# Patient Record
Sex: FEMALE | Race: WHITE | Employment: OTHER | URBAN - NONMETROPOLITAN AREA
[De-identification: names, ages, dates, MRNs, and addresses within clinical notes are randomized per-mention and may not be internally consistent; named-entity substitution may affect disease eponyms.]

---

## 2017-04-01 PROBLEM — O09.33 NO PRENATAL CARE IN CURRENT PREGNANCY IN THIRD TRIMESTER: Status: ACTIVE | Noted: 2017-04-01

## 2021-05-28 ENCOUNTER — NURSE ONLY (OUTPATIENT)
Dept: LAB | Age: 31
End: 2021-05-28

## 2021-06-03 LAB
APTIMA MEDIA TYPE: NORMAL
CHLAMYDIA TRACHOMATIS AMPLIFIED DET: NEGATIVE
NEISSERIA GONORRHOEAE BY AMP: NEGATIVE
SPECIMEN SOURCE: NORMAL
T. VAGINALIS SPECIMEN SOURCE: NORMAL
TRICHOMONAS VAGINALIS BY NAA: NEGATIVE

## 2024-01-15 ENCOUNTER — OFFICE VISIT (OUTPATIENT)
Age: 34
End: 2024-01-15
Payer: COMMERCIAL

## 2024-01-15 VITALS
HEIGHT: 65 IN | BODY MASS INDEX: 33.32 KG/M2 | SYSTOLIC BLOOD PRESSURE: 134 MMHG | HEART RATE: 104 BPM | DIASTOLIC BLOOD PRESSURE: 84 MMHG | WEIGHT: 200 LBS

## 2024-01-15 DIAGNOSIS — E04.1 THYROID NODULE: ICD-10-CM

## 2024-01-15 DIAGNOSIS — E03.9 ACQUIRED HYPOTHYROIDISM: Primary | ICD-10-CM

## 2024-01-15 PROCEDURE — 4004F PT TOBACCO SCREEN RCVD TLK: CPT | Performed by: INTERNAL MEDICINE

## 2024-01-15 PROCEDURE — G8427 DOCREV CUR MEDS BY ELIG CLIN: HCPCS | Performed by: INTERNAL MEDICINE

## 2024-01-15 PROCEDURE — 99204 OFFICE O/P NEW MOD 45 MIN: CPT | Performed by: INTERNAL MEDICINE

## 2024-01-15 PROCEDURE — G8417 CALC BMI ABV UP PARAM F/U: HCPCS | Performed by: INTERNAL MEDICINE

## 2024-01-15 PROCEDURE — G8484 FLU IMMUNIZE NO ADMIN: HCPCS | Performed by: INTERNAL MEDICINE

## 2024-01-15 RX ORDER — AMITRIPTYLINE HYDROCHLORIDE 10 MG/1
TABLET, FILM COATED ORAL
COMMUNITY
Start: 2022-12-20

## 2024-01-15 RX ORDER — LEVOTHYROXINE SODIUM 0.1 MG/1
100 TABLET ORAL DAILY
Qty: 90 TABLET | Refills: 1 | Status: SHIPPED | OUTPATIENT
Start: 2024-01-15

## 2024-01-15 NOTE — PROGRESS NOTES
[facility] [place] [facility]  [facility] ENDOCRINOLOGY  [address]  [facility] [address]  Dept: [phone]  Loc: [phone]     Visit Date: 1/15/2024    Johanny Jaramillo is a 33 y.o. female who presents today for:  Chief Complaint   Patient presents with    Thyroid Problem    Hypothyroidism            Subjective:      HPI     Johanny Jaramillo is a 33 y.o. , female who comes for f/u for hypothyroidism and thyroid nodule.  Referring Provider: Jack Strong MD  She was diagnosed with hypothyroidism 1 year ago  Etiology of hypothyroidism is Unknown. Current therapy includes levothyroxine 100 mcg .  The patient has not had any labs since the last visit.  However she is complaining of dry skin, brittle hair and fatigue.  The patient had a repeat ultrasound of the thyroid gland on 2023 we did not document any nodules on the thyroid.  History reviewed. No pertinent past medical history.   Past Surgical History:   Procedure Laterality Date     SECTION, LOW TRANSVERSE  ,       Family History   Problem Relation Age of Onset    High Blood Pressure Maternal Grandmother     Diabetes Paternal Grandfather     Heart Disease Paternal Grandfather     Other Neg Hx      Social History     Tobacco Use    Smoking status: Light Smoker     Types: Cigarettes    Smokeless tobacco: Not on file   Substance Use Topics    Alcohol use: Yes     Comment: occ.      Current Outpatient Medications   Medication Sig Dispense Refill    amitriptyline (ELAVIL) 10 MG tablet Take by mouth      levothyroxine (SYNTHROID) 100 MCG tablet Take 1 tablet by mouth Daily 90 tablet 1    topiramate (TOPAMAX) 25 MG tablet 4 tablets 2 times daily      Etonogestrel (NEXPLANON SC) Inject into the skin      ibuprofen (ADVIL;MOTRIN) 800 MG tablet Take 1 tablet by mouth every 8 hours as needed. 60 tablet 1    NURTEC 75 MG TBDP 75 mg as needed (Patient not taking: Reported on 1/15/2024)      SUMAtriptan (IMITREX) 25 MG

## 2024-01-16 LAB
T4 FREE: 0.79 NG/DL (ref 0.61–1.12)
TSH SERPL DL<=0.05 MIU/L-ACNC: 4.55 MCIU/ML (ref 0.49–4.67)

## 2024-07-15 ENCOUNTER — OFFICE VISIT (OUTPATIENT)
Age: 34
End: 2024-07-15
Payer: COMMERCIAL

## 2024-07-15 VITALS
SYSTOLIC BLOOD PRESSURE: 110 MMHG | BODY MASS INDEX: 33.09 KG/M2 | HEIGHT: 65 IN | DIASTOLIC BLOOD PRESSURE: 86 MMHG | WEIGHT: 198.6 LBS | HEART RATE: 93 BPM

## 2024-07-15 DIAGNOSIS — E03.9 ACQUIRED HYPOTHYROIDISM: Primary | ICD-10-CM

## 2024-07-15 PROCEDURE — 99213 OFFICE O/P EST LOW 20 MIN: CPT | Performed by: INTERNAL MEDICINE

## 2024-07-15 PROCEDURE — G8427 DOCREV CUR MEDS BY ELIG CLIN: HCPCS | Performed by: INTERNAL MEDICINE

## 2024-07-15 PROCEDURE — 4004F PT TOBACCO SCREEN RCVD TLK: CPT | Performed by: INTERNAL MEDICINE

## 2024-07-15 PROCEDURE — G8417 CALC BMI ABV UP PARAM F/U: HCPCS | Performed by: INTERNAL MEDICINE

## 2024-07-15 NOTE — PROGRESS NOTES
[facility] [place] [facility]  [facility] ENDOCRINOLOGY  [address]  [facility] [address]  Dept: [phone]  Loc: [phone]     Visit Date: 7/15/2024    Johanny Jaramillo is a 33 y.o. female who presents today for:  Chief Complaint   Patient presents with    Hypothyroidism            Subjective:    Johanny Jaramillo is a 33 y.o. , female who comes for follow up on hypothyroidism.    Moni Manzo, PHONG - MARYJO  Johanny Jaramillo was diagnosed with hypothyroidism 1 year and 6 months ago.   Underlying cause of hypothyroidism is Unknown.   Current therapy includes  Levothyroxine 100mcg .    Recent labs showed 2024 4.547, T4 .79.  Symptoms are as follows: Cold tendency, Dry skin, Brittle hair, fatigue, low energy.    There is no thyroid pain, choking or hoarseness of the voice.  Last ultrasound 2024 showed no solid or cystic nodules, diffusely lobular and inhomogeneous thyroid gland.       History reviewed. No pertinent past medical history.   Past Surgical History:   Procedure Laterality Date     SECTION, LOW TRANSVERSE  ,       Family History   Problem Relation Age of Onset    High Blood Pressure Maternal Grandmother     Diabetes Paternal Grandfather     Heart Disease Paternal Grandfather     Other Neg Hx      Social History     Tobacco Use    Smoking status: Light Smoker     Types: Cigarettes    Smokeless tobacco: Not on file   Substance Use Topics    Alcohol use: Yes     Comment: occ.      Current Outpatient Medications   Medication Sig Dispense Refill    amitriptyline (ELAVIL) 10 MG tablet Take by mouth 2 tablets nightly      levothyroxine (SYNTHROID) 100 MCG tablet Take 1 tablet by mouth Daily 90 tablet 1    NURTEC 75 MG TBDP 75 mg as needed      topiramate (TOPAMAX) 25 MG tablet 4 tablets 2 times daily      Etonogestrel (NEXPLANON SC) Inject into the skin      SUMAtriptan (IMITREX) 25 MG tablet 1 tablet as needed (Patient stated takes this PRN) (Patient

## 2024-07-24 ENCOUNTER — CLINICAL DOCUMENTATION (OUTPATIENT)
Age: 34
End: 2024-07-24

## 2024-07-24 DIAGNOSIS — E03.9 ACQUIRED HYPOTHYROIDISM: Primary | ICD-10-CM

## 2024-07-24 RX ORDER — LEVOTHYROXINE SODIUM 112 UG/1
112 TABLET ORAL DAILY
Qty: 30 TABLET | Refills: 3 | Status: SHIPPED | OUTPATIENT
Start: 2024-07-24

## 2024-07-25 NOTE — RESULT ENCOUNTER NOTE
Abnormal thyroid labs.  Change Synthroid to 112 mcg daily.  Get free T4 and TSH in a month.  Please coordinate.  I called and did not get her.

## 2024-07-26 ENCOUNTER — TELEPHONE (OUTPATIENT)
Age: 34
End: 2024-07-26

## 2024-07-26 NOTE — TELEPHONE ENCOUNTER
----- Message from Newton Landry MD sent at 7/24/2024  8:30 PM EDT -----  Abnormal thyroid labs.  Change Synthroid to 112 mcg daily.  Get free T4 and TSH in a month.  Please coordinate.  I called and did not get her.

## 2024-08-26 LAB
T4 FREE: 0.72 NG/DL (ref 0.61–1.12)
TSH SERPL DL<=0.05 MIU/L-ACNC: 4.47 MCIU/ML (ref 0.49–4.67)

## 2024-08-31 ENCOUNTER — CLINICAL DOCUMENTATION (OUTPATIENT)
Age: 34
End: 2024-08-31

## 2024-08-31 DIAGNOSIS — E03.9 ACQUIRED HYPOTHYROIDISM: Primary | ICD-10-CM

## 2024-08-31 NOTE — PROGRESS NOTES
Thyroid levels have improved but not yet ideal.  Change Synthroid to 1 tablet daily for 6 days and 1.5 tablets once a week.  Get free T4 and TSH in a month.

## 2024-09-03 ENCOUNTER — TELEPHONE (OUTPATIENT)
Age: 34
End: 2024-09-03

## 2024-09-03 NOTE — TELEPHONE ENCOUNTER
----- Message from Dr. Newton Landry MD sent at 8/31/2024  4:52 PM EDT -----  Thyroid levels have improved but not yet ideal.  Change Synthroid to 1 tablet daily for 6 days and 1.5 tablets once a week.  Get free T4 and TSH in a month.

## 2024-10-30 LAB
T4 FREE: 0.87 NG/DL (ref 0.61–1.12)
TSH SERPL DL<=0.05 MIU/L-ACNC: 3.03 MCIU/ML (ref 0.49–4.67)

## 2025-01-15 ENCOUNTER — OFFICE VISIT (OUTPATIENT)
Age: 35
End: 2025-01-15
Payer: COMMERCIAL

## 2025-01-15 VITALS
HEART RATE: 82 BPM | SYSTOLIC BLOOD PRESSURE: 116 MMHG | BODY MASS INDEX: 34.29 KG/M2 | WEIGHT: 205.8 LBS | HEIGHT: 65 IN | DIASTOLIC BLOOD PRESSURE: 78 MMHG

## 2025-01-15 DIAGNOSIS — E03.9 ACQUIRED HYPOTHYROIDISM: Primary | ICD-10-CM

## 2025-01-15 LAB
T4 FREE: 0.68 NG/DL (ref 0.61–1.12)
TSH SERPL DL<=0.05 MIU/L-ACNC: 13.28 MCIU/ML (ref 0.49–4.67)

## 2025-01-15 PROCEDURE — G8427 DOCREV CUR MEDS BY ELIG CLIN: HCPCS | Performed by: INTERNAL MEDICINE

## 2025-01-15 PROCEDURE — G8417 CALC BMI ABV UP PARAM F/U: HCPCS | Performed by: INTERNAL MEDICINE

## 2025-01-15 PROCEDURE — 4004F PT TOBACCO SCREEN RCVD TLK: CPT | Performed by: INTERNAL MEDICINE

## 2025-01-15 PROCEDURE — 99213 OFFICE O/P EST LOW 20 MIN: CPT | Performed by: INTERNAL MEDICINE

## 2025-01-15 RX ORDER — LEVOTHYROXINE SODIUM 112 UG/1
TABLET ORAL
Qty: 30 TABLET | Refills: 5 | Status: SHIPPED | OUTPATIENT
Start: 2025-01-15 | End: 2025-01-18 | Stop reason: ALTCHOICE

## 2025-01-15 RX ORDER — BUTALBITAL, ACETAMINOPHEN AND CAFFEINE 50; 325; 40 MG/1; MG/1; MG/1
TABLET ORAL
COMMUNITY
Start: 2025-01-06

## 2025-01-15 RX ORDER — RIZATRIPTAN BENZOATE 10 MG/1
TABLET, ORALLY DISINTEGRATING ORAL
COMMUNITY
Start: 2024-12-02

## 2025-01-15 NOTE — PROGRESS NOTES
rizatriptan (MAXALT-MLT) 10 MG disintegrating tablet       levothyroxine (SYNTHROID) 112 MCG tablet 1 tablet daily with an extra 1/2 tablet on sundays 30 tablet 5    amitriptyline (ELAVIL) 10 MG tablet Take by mouth 2 tablets nightly      topiramate (TOPAMAX) 25 MG tablet 4 tablets 2 times daily      Etonogestrel (NEXPLANON SC) Inject into the skin      NURTEC 75 MG TBDP 75 mg as needed      SUMAtriptan (IMITREX) 25 MG tablet 1 tablet as needed (Patient stated takes this PRN) (Patient not taking: Reported on 7/15/2024)      ibuprofen (ADVIL;MOTRIN) 800 MG tablet Take 1 tablet by mouth every 8 hours (Patient not taking: Reported on 1/15/2024) 120 tablet 3    ibuprofen (ADVIL;MOTRIN) 800 MG tablet Take 1 tablet by mouth every 8 hours as needed. (Patient not taking: Reported on 7/15/2024) 60 tablet 1     No current facility-administered medications for this visit.     Allergies   Allergen Reactions    Amoxicillin Rash     Health Maintenance   Topic Date Due    Polio vaccine (4 of 4 - 4-dose series) 12/31/1995    Pneumococcal 0-64 years Vaccine (1 of 2 - PCV) Never done    Depression Screen  Never done    Varicella vaccine (1 of 2 - 13+ 2-dose series) Never done    HIV screen  Never done    Hepatitis B vaccine (1 of 3 - 19+ 3-dose series) Never done    Flu vaccine (1) Never done    COVID-19 Vaccine (1 - 2023-24 season) Never done    Cervical cancer screen  05/31/2025    DTaP/Tdap/Td vaccine (9 - Td or Tdap) 12/24/2033    Hib vaccine  Completed    Hepatitis C screen  Completed    Hepatitis A vaccine  Aged Out    HPV vaccine  Aged Out    Meningococcal (ACWY) vaccine  Aged Out         Review of Systems    Constitutional: negative for chills and fevers  Eyes: negative for irritation, redness and visual disturbance  Respiratory: negative for cough, shortness of breath and wheezing  Cardiovascular: negative for chest pain, irregular heart beat and palpitations   The remainder of systems were reviewed and negative.

## 2025-01-18 ENCOUNTER — CLINICAL DOCUMENTATION (OUTPATIENT)
Age: 35
End: 2025-01-18

## 2025-01-18 DIAGNOSIS — E03.9 ACQUIRED HYPOTHYROIDISM: Primary | ICD-10-CM

## 2025-01-18 RX ORDER — LEVOTHYROXINE SODIUM 137 UG/1
137 TABLET ORAL DAILY
Qty: 30 TABLET | Refills: 3 | Status: SHIPPED | OUTPATIENT
Start: 2025-01-18

## 2025-01-20 ENCOUNTER — TELEPHONE (OUTPATIENT)
Age: 35
End: 2025-01-20

## 2025-01-20 NOTE — TELEPHONE ENCOUNTER
----- Message from Dr. Newton Landry MD sent at 1/18/2025 11:08 PM EST -----  Chenge synthroid to 137 mcg daily.  Get ft4 and tsh in 1 month

## 2025-01-21 NOTE — TELEPHONE ENCOUNTER
Pt informed and verbalized understanding with no further questions at this time. Labs faxed to [facility].

## 2025-03-07 LAB
T4 FREE: 0.88 NG/DL (ref 0.61–1.12)
TSH SERPL DL<=0.05 MIU/L-ACNC: 2.15 MCIU/ML (ref 0.49–4.67)

## 2025-03-08 ENCOUNTER — RESULTS FOLLOW-UP (OUTPATIENT)
Age: 35
End: 2025-03-08

## 2025-07-07 ENCOUNTER — LAB (OUTPATIENT)
Dept: LAB | Age: 35
End: 2025-07-07

## 2025-07-15 ENCOUNTER — OFFICE VISIT (OUTPATIENT)
Age: 35
End: 2025-07-15
Payer: COMMERCIAL

## 2025-07-15 VITALS
DIASTOLIC BLOOD PRESSURE: 86 MMHG | WEIGHT: 196.4 LBS | HEART RATE: 91 BPM | HEIGHT: 65 IN | SYSTOLIC BLOOD PRESSURE: 118 MMHG | BODY MASS INDEX: 32.72 KG/M2

## 2025-07-15 DIAGNOSIS — E03.9 ACQUIRED HYPOTHYROIDISM: ICD-10-CM

## 2025-07-15 PROCEDURE — 99213 OFFICE O/P EST LOW 20 MIN: CPT | Performed by: INTERNAL MEDICINE

## 2025-07-15 PROCEDURE — G8427 DOCREV CUR MEDS BY ELIG CLIN: HCPCS | Performed by: INTERNAL MEDICINE

## 2025-07-15 PROCEDURE — G8417 CALC BMI ABV UP PARAM F/U: HCPCS | Performed by: INTERNAL MEDICINE

## 2025-07-15 PROCEDURE — 4004F PT TOBACCO SCREEN RCVD TLK: CPT | Performed by: INTERNAL MEDICINE

## 2025-07-15 RX ORDER — LEVOTHYROXINE SODIUM 137 UG/1
137 TABLET ORAL DAILY
Qty: 30 TABLET | Refills: 5 | Status: SHIPPED | OUTPATIENT
Start: 2025-07-15

## 2025-07-15 RX ORDER — NORTRIPTYLINE HYDROCHLORIDE 75 MG/1
CAPSULE ORAL
COMMUNITY

## 2025-07-15 NOTE — PROGRESS NOTES
[facility] [place] [facility]  [facility] ENDOCRINOLOGY  [address]  [address]  [place] [facility] [address]  Dept: [phone]  Loc: [phone]     Visit Date: 7/15/2025    Johanny Jaramillo is a 34 y.o. female who presents today for:  Chief Complaint   Patient presents with    Follow-up     Acquired hypothyroidism    The patient (or guardian, if applicable) and other individuals in attendance with the patient were advised that Artificial Intelligence will be utilized during this visit to record, process the conversation to generate a clinical note, and support improvement of the AI technology. The patient (or guardian, if applicable) and other individuals in attendance at the appointment consented to the use of AI, including the recording.         Subjective:      HPI   History of Present Illness  The patient is a 34-year-old female who presents for hypothyroidism.    She was diagnosed with hypothyroidism approximately 2.5 years ago. Her Synthroid dosage was recently adjusted to 137 mcg daily. She reports feeling well overall, with no unusual cold sensations, constipation, or fatigue. She has not experienced any weight gain, noting a decrease from over 200 pounds to 196 pounds. She does not report any symptoms of depression. She has not observed any changes in her skin texture, but mentions that her hair has become more brittle since the birth of her two children. She has been advised to try different shampoos for this issue.    No past medical history on file.   Past Surgical History:   Procedure Laterality Date     SECTION, LOW TRANSVERSE  ,       Family History   Problem Relation Age of Onset    High Blood Pressure Maternal Grandmother     Diabetes Paternal Grandfather     Heart Disease Paternal Grandfather     Other Neg Hx      Social History     Tobacco Use    Smoking status: Light Smoker     Types: Cigarettes    Smokeless tobacco: Not on file   Substance Use Topics

## 2025-07-17 LAB
CYTOLOGY THIN PREP PAP: NORMAL
T4 FREE: 0.8
T4 FREE: 0.8 NG/DL (ref 0.61–1.12)
TSH SERPL DL<=0.05 MIU/L-ACNC: 5.9 MCIU/ML (ref 0.49–4.67)
TSH SERPL DL<=0.05 MIU/L-ACNC: 5.9 UIU/ML

## 2025-07-26 ENCOUNTER — RESULTS FOLLOW-UP (OUTPATIENT)
Age: 35
End: 2025-07-26

## 2025-07-26 ENCOUNTER — CLINICAL DOCUMENTATION (OUTPATIENT)
Age: 35
End: 2025-07-26

## 2025-07-26 DIAGNOSIS — E03.9 ACQUIRED HYPOTHYROIDISM: Primary | ICD-10-CM

## 2025-07-26 NOTE — PROGRESS NOTES
Abnormal thyroid labs.  Change Synthroid to 1.5 tablets on Sunday and 1 tablet daily the rest of the week.  Free T4 and TSH in a month.